# Patient Record
Sex: MALE | Race: WHITE
[De-identification: names, ages, dates, MRNs, and addresses within clinical notes are randomized per-mention and may not be internally consistent; named-entity substitution may affect disease eponyms.]

---

## 2018-02-22 ENCOUNTER — HOSPITAL ENCOUNTER (OUTPATIENT)
Dept: HOSPITAL 89 - LAB | Age: 83
End: 2018-02-22
Attending: INTERNAL MEDICINE
Payer: MEDICARE

## 2018-02-22 DIAGNOSIS — I10: ICD-10-CM

## 2018-02-22 DIAGNOSIS — N40.0: Primary | ICD-10-CM

## 2018-02-22 DIAGNOSIS — E11.9: ICD-10-CM

## 2018-02-22 DIAGNOSIS — I25.10: ICD-10-CM

## 2018-02-22 DIAGNOSIS — E78.5: ICD-10-CM

## 2018-02-22 DIAGNOSIS — Z86.718: ICD-10-CM

## 2018-02-22 LAB
INR PPP: 2.68
LDLC SERPL-MCNC: 69 MG/DL
PLATELET COUNT, AUTOMATED: 109 K/UL (ref 150–450)

## 2018-02-22 PROCEDURE — 84478 ASSAY OF TRIGLYCERIDES: CPT

## 2018-02-22 PROCEDURE — 83036 HEMOGLOBIN GLYCOSYLATED A1C: CPT

## 2018-02-22 PROCEDURE — 82310 ASSAY OF CALCIUM: CPT

## 2018-02-22 PROCEDURE — 82947 ASSAY GLUCOSE BLOOD QUANT: CPT

## 2018-02-22 PROCEDURE — 85610 PROTHROMBIN TIME: CPT

## 2018-02-22 PROCEDURE — 36415 COLL VENOUS BLD VENIPUNCTURE: CPT

## 2018-02-22 PROCEDURE — 84550 ASSAY OF BLOOD/URIC ACID: CPT

## 2018-02-22 PROCEDURE — 84155 ASSAY OF PROTEIN SERUM: CPT

## 2018-02-22 PROCEDURE — 85025 COMPLETE CBC W/AUTO DIFF WBC: CPT

## 2018-02-22 PROCEDURE — 84450 TRANSFERASE (AST) (SGOT): CPT

## 2018-02-22 PROCEDURE — 82247 BILIRUBIN TOTAL: CPT

## 2018-02-22 PROCEDURE — 84443 ASSAY THYROID STIM HORMONE: CPT

## 2018-02-22 PROCEDURE — 82374 ASSAY BLOOD CARBON DIOXIDE: CPT

## 2018-02-22 PROCEDURE — 82465 ASSAY BLD/SERUM CHOLESTEROL: CPT

## 2018-02-22 PROCEDURE — 82435 ASSAY OF BLOOD CHLORIDE: CPT

## 2018-02-22 PROCEDURE — 82565 ASSAY OF CREATININE: CPT

## 2018-02-22 PROCEDURE — 83718 ASSAY OF LIPOPROTEIN: CPT

## 2018-02-22 PROCEDURE — 84132 ASSAY OF SERUM POTASSIUM: CPT

## 2018-02-22 PROCEDURE — 84075 ASSAY ALKALINE PHOSPHATASE: CPT

## 2018-02-22 PROCEDURE — 82040 ASSAY OF SERUM ALBUMIN: CPT

## 2018-02-22 PROCEDURE — 84295 ASSAY OF SERUM SODIUM: CPT

## 2018-02-22 PROCEDURE — 84520 ASSAY OF UREA NITROGEN: CPT

## 2018-02-22 PROCEDURE — 81001 URINALYSIS AUTO W/SCOPE: CPT

## 2018-02-22 PROCEDURE — 84460 ALANINE AMINO (ALT) (SGPT): CPT

## 2018-02-23 ENCOUNTER — HOSPITAL ENCOUNTER (OUTPATIENT)
Dept: HOSPITAL 89 - AMB | Age: 83
End: 2018-02-23
Payer: MEDICARE

## 2018-02-23 ENCOUNTER — HOSPITAL ENCOUNTER (EMERGENCY)
Dept: HOSPITAL 89 - ER | Age: 83
Discharge: ANOTHER HOSPITAL | End: 2018-02-23
Payer: MEDICARE

## 2018-02-23 VITALS — SYSTOLIC BLOOD PRESSURE: 130 MMHG | DIASTOLIC BLOOD PRESSURE: 85 MMHG

## 2018-02-23 DIAGNOSIS — I21.4: Primary | ICD-10-CM

## 2018-02-23 DIAGNOSIS — R06.02: Primary | ICD-10-CM

## 2018-02-23 DIAGNOSIS — R09.02: ICD-10-CM

## 2018-02-23 DIAGNOSIS — R53.1: ICD-10-CM

## 2018-02-23 LAB
INR PPP: 2.63
PLATELET COUNT, AUTOMATED: 93 K/UL (ref 150–450)

## 2018-02-23 PROCEDURE — 87502 INFLUENZA DNA AMP PROBE: CPT

## 2018-02-23 PROCEDURE — 71045 X-RAY EXAM CHEST 1 VIEW: CPT

## 2018-02-23 PROCEDURE — 82247 BILIRUBIN TOTAL: CPT

## 2018-02-23 PROCEDURE — 84520 ASSAY OF UREA NITROGEN: CPT

## 2018-02-23 PROCEDURE — 85730 THROMBOPLASTIN TIME PARTIAL: CPT

## 2018-02-23 PROCEDURE — 82435 ASSAY OF BLOOD CHLORIDE: CPT

## 2018-02-23 PROCEDURE — 93005 ELECTROCARDIOGRAM TRACING: CPT

## 2018-02-23 PROCEDURE — 82374 ASSAY BLOOD CARBON DIOXIDE: CPT

## 2018-02-23 PROCEDURE — 82040 ASSAY OF SERUM ALBUMIN: CPT

## 2018-02-23 PROCEDURE — 85610 PROTHROMBIN TIME: CPT

## 2018-02-23 PROCEDURE — 99285 EMERGENCY DEPT VISIT HI MDM: CPT

## 2018-02-23 PROCEDURE — 82310 ASSAY OF CALCIUM: CPT

## 2018-02-23 PROCEDURE — 85379 FIBRIN DEGRADATION QUANT: CPT

## 2018-02-23 PROCEDURE — 82947 ASSAY GLUCOSE BLOOD QUANT: CPT

## 2018-02-23 PROCEDURE — 84450 TRANSFERASE (AST) (SGOT): CPT

## 2018-02-23 PROCEDURE — 84460 ALANINE AMINO (ALT) (SGPT): CPT

## 2018-02-23 PROCEDURE — 84295 ASSAY OF SERUM SODIUM: CPT

## 2018-02-23 PROCEDURE — 85025 COMPLETE CBC W/AUTO DIFF WBC: CPT

## 2018-02-23 PROCEDURE — 82565 ASSAY OF CREATININE: CPT

## 2018-02-23 PROCEDURE — 82375 ASSAY CARBOXYHB QUANT: CPT

## 2018-02-23 PROCEDURE — 84075 ASSAY ALKALINE PHOSPHATASE: CPT

## 2018-02-23 PROCEDURE — 84132 ASSAY OF SERUM POTASSIUM: CPT

## 2018-02-23 PROCEDURE — 84484 ASSAY OF TROPONIN QUANT: CPT

## 2018-02-23 PROCEDURE — 84155 ASSAY OF PROTEIN SERUM: CPT

## 2018-02-23 PROCEDURE — 83880 ASSAY OF NATRIURETIC PEPTIDE: CPT

## 2018-02-23 NOTE — ER REPORT
History and Physical


Time Seen By MD:  18:38


Hx. of Stated Complaint:  


CP, SOB SINCE YESTERDYA.


HPI/ROS


CHIEF COMPLAINT: Chest pain and shortness of breath





HISTORY OF PRESENT ILLNESS: This is an 88-year-old male. He has had chest pain 

and shortness of breath since yesterday. Chest pain is more substernal 

pressure. EMS arrived at his home after called by his granddaughter. His 

granddaughter called because he was having some confusion tonight as well. He 

usually wears 2 L of oxygen and was wearing this however oxygen saturations in 

the mid 70s. When switched to 5 L by nasal cannula by EMS, his oxygen improved 

up into the mid 90s and his confusion also improved. He is a poor historian and 

can give us General Information but not details. Sounds like he is on warfarin 

for history of DVT. He has a history of coronary artery bypass grafts as well 

as stents in the past. He does take a daily aspirin low dose. He denies having 

any fevers or chills. He always has a mild cough which is unchanged. EMS 

reported that they had a slight change on their carbon monoxide detector in 

their sending the fire department to look at the patient's home for possible 

carbon monoxide exposure.





REVIEW OF SYSTEMS:


Constitutional: No fever or chills.


Eyes: No vision changes.


ENT: No sore throat. No congestion.


Cardiovascular: As above.


Respiratory: As above.


Gastrointestinal: No abdominal pain. No nausea or vomiting.


Genitourinary: No pain with urination.


Musculoskeletal: No musculoskeletal pain.


Skin: No rashes.


Neurological: Feels overall weak. No headache. Mild dizziness.


Allergies:  


Coded Allergies:  


     adhesive tape (Verified  Allergy, Mild, RASH, 2/15/16)


     metformin (Verified  Allergy, Unknown, 11/8/17)


 EHR CONVERSION


Home Meds


Active Scripts


Simvastatin (SIMVASTATIN) 20 Mg Tablet, 20 MG PO HS, #90 TAB


   Prov:CHARELS GRACIA MD         2/13/18


Amiodarone Hcl (PACERONE) 200 Mg Tablet, 0.5 TAB PO QDAY, #90 TAB


   half tablet daily, x6 times per week.


   Prov:CHARLES GRACIA MD         1/19/18


Reported Medications


Bimatoprost (Bimatoprost) 0.03 % Drop.w.juan, 1 DROP OU QDAY


   12/14/17


Oxybutynin Chloride (OXYBUTYNIN CHLORIDE ER) 10 Mg Tab.er.24, 10 MG PO QDAY, 

TAB.SR


   12/14/17


Tamsulosin Hcl (TAMSULOSIN HCL) 0.4 Mg Cap.er.24h, 0.4 MG PO, CAP


   12/14/17


Solifenacin Succinate (VESICARE) 5 Mg Tablet, 5 MG PO


   11/8/17


Glimepiride (GLIMEPIRIDE) 1 Mg Tablet, 1 MG PO QDAY


   11/8/17


Calcium Polycarbophil (FIBERCON) 625 Mg Tablet, 625 MG PO


   11/8/17


Finasteride (FINASTERIDE) 5 Mg Tablet, 5 MG PO QDAY


   2/15/16


Warfarin Sodium (COUMADIN) 5 Mg Tablet, 5 MG PO TU,TH,SA,DUMONT


   2/15/16


Warfarin Sodium (COUMADIN) 2.5 Mg Tablet, 2.5 MG PO M,W,FR


   2/15/16


Oxygen (Oxygen) 2 L Inha, 2 L INH QHS, 0 Refills


   12/28/11


Lisinopril (Prinivil) 10 Mg Tab, 2.5 MG PO QDAY, #20 0 Refills


   12/28/11


Beta-Carotene(A) W-C & E/Min (Ocuvite Tablet) 1 Tab Tablet, 1 TAB PO DAILY, 0 

Refills


   12/28/11


Aspirin (Baby Aspirin) 81 Mg Tab.chew, 81 MG PO DAILY, 0 Refills


   ON HOLD NOW


   12/28/11


Past Medical/Surgical History


CABG, stents, DVT, chronic kidney disease, hypertension, bph


Reviewed Nurses Notes:  Yes


Hx Smoking:  Yes


Smoking Status:  Former Smoker


Exposure to Second Hand Smoke?:  No


Hx Substance Use Disorder:  No


Hx Alcohol Use:  Yes (every evening)


Constitutional





Vital Sign - Last 24 Hours








 2/23/18 2/23/18 2/23/18 2/23/18





 18:32 18:33 18:33 18:44


 


Temp  97.5  


 


Pulse  93  94


 


Resp  18  


 


B/P (MAP) 143/92 (109) 143/92  


 


Pulse Ox  94  90


 


O2 Delivery  Nasal Cannula  


 


O2 Flow Rate   4.0 


 


    





 2/23/18 2/23/18 2/23/18 2/23/18





 18:45 18:59 19:00 19:14


 


Pulse  92  91


 


B/P (MAP) 141/86 (104)  134/83 (100) 


 


Pulse Ox  89  90





 2/23/18 2/23/18 2/23/18 2/23/18





 19:15 19:29 19:30 19:44


 


Pulse  90  91


 


B/P (MAP) 132/81 (98)  130/75 (93) 


 


Pulse Ox  88  89





 2/23/18 2/23/18 2/23/18 2/23/18





 19:45 19:59 20:00 20:14


 


Pulse  94  94


 


B/P (MAP) 135/79 (97)  130/79 (96) 


 


Pulse Ox  92  91





 2/23/18 2/23/18 2/23/18 2/23/18





 20:15 20:20 20:30 20:35


 


Pulse  92  92


 


B/P (MAP) 140/88 (105)  140/84 (102) 


 


Pulse Ox  91  92





 2/23/18 2/23/18 2/23/18 2/23/18





 20:45 20:50 20:55 21:00


 


Pulse  88 86 


 


B/P (MAP) 136/78 (97)   130/85 (100)


 


Pulse Ox  89 90 








Physical Exam


   General Appearance: The patient is alert. No acute distress.


Eyes: Pupils are equal, round. Reactive to light. No pallor, injection or 

icterus. Extraocular movements are intact.


ENT: Mucous membranes are moist. Normal oral mucosa. Posterior oropharynx is 

normal.


Neck: Supple and non tender. No lymphadenopathy.


Respiratory: Breathing easily and unlabored. Lungs are clear to auscultation.


Cardiovascular: Regular rate and rhythm. No murmurs, gallops or rubs. Normal 

capillary refill. No edema.


Gastrointestinal: Abdomen is soft and non tender. Nondistended. Normal active 

bowel sounds.


Neurological: Alert and oriented to person and place, not to time. No focal 

neurologic deficits in the extremities. 


Skin: Warm and dry. No rashes.


Musculoskeletal: Extremities are nontender. Full range of motion.





DIFFERENTIAL DIAGNOSIS: After history and physical exam, differential diagnosis 

was considered for chest pain and shortness of breath including but not limited 

to myocardial ischemia, pericarditis pulmonary embolus, chest wall pain, 

pleural inflammation and pulmonary infectious causes.





Medical Decision Making


Data Points


Result Diagram:  


2/23/18 1830 2/23/18 1830





Laboratory





Hematology








Test


  2/23/18


00:00 2/23/18


18:30 2/23/18


18:49


 


Carboxyhemoglobin 2.3 % (< 5.0)   


 


Red Blood Count


  


  4.59 M/uL


(4.00-5.60) 


 


 


Mean Corpuscular Volume


  


  95.1 fL


(80.0-96.0) 


 


 


Mean Corpuscular Hemoglobin


  


  31.7 pg


(26.0-33.0) 


 


 


Mean Corpuscular Hemoglobin


Concent 


  33.3 g/dL


(32.0-36.0) 


 


 


Red Cell Distribution Width


  


  15.3 %


(11.5-14.5) 


 


 


Mean Platelet Volume


  


  8.7 fL


(7.2-11.1) 


 


 


Neutrophils (%) (Auto)


  


  59.1 %


(39.4-72.5) 


 


 


Lymphocytes (%) (Auto)


  


  32.0 %


(17.6-49.6) 


 


 


Monocytes (%) (Auto)


  


  8.8 %


(4.1-12.4) 


 


 


Eosinophils (%) (Auto)


  


  0.0 %


(0.4-6.7) 


 


 


Basophils (%) (Auto)


  


  0.1 %


(0.3-1.4) 


 


 


Nucleated RBC Relative Count


(auto) 


  0.0 /100WBC 


  


 


 


Neutrophils # (Auto)


  


  10.3 K/uL


(2.0-7.4) 


 


 


Lymphocytes # (Auto)


  


  5.6 K/uL


(1.3-3.6) 


 


 


Monocytes # (Auto)


  


  1.5 K/uL


(0.3-1.0) 


 


 


Eosinophils # (Auto)


  


  0.0 K/uL


(0.0-0.5) 


 


 


Basophils # (Auto)


  


  0.0 K/uL


(0.0-0.1) 


 


 


Nucleated RBC Absolute Count


(auto) 


  0.00 K/uL 


  


 


 


Prothrombin Time


  


  29.0 seconds


(12.0-14.4) 


 


 


Prothromb Time International


Ratio 


  2.63 


  


 


 


Activated Partial


Thromboplast Time 


  53 seconds


(23-35) 


 


 


D-Dimer Quantitative (PE/DVT)


  


  0.42 ug/ml


(0-0.50) 


 


 


Sodium Level


  


  136 mmol/L


(137-145) 


 


 


Potassium Level


  


  4.9 mmol/L


(3.5-5.0) 


 


 


Chloride Level


  


  101 mmol/L


() 


 


 


Carbon Dioxide Level


  


  26 mmol/L


(22-30) 


 


 


Blood Urea Nitrogen


  


  37 mg/dl


(9-21) 


 


 


Creatinine


  


  1.60 mg/dl


(0.66-1.25) 


 


 


Glomerular Filtration Rate


Calc 


  41.0 


  


 


 


Random Glucose


  


  148 mg/dl


() 


 


 


Calcium Level


  


  8.9 mg/dl


(8.4-10.2) 


 


 


Total Bilirubin


  


  0.9 mg/dl


(0.2-1.3) 


 


 


Aspartate Amino Transf


(AST/SGOT) 


  270 U/L (0-35) 


  


 


 


Alanine Aminotransferase


(ALT/SGPT) 


  86 U/L (0-56) 


  


 


 


Alkaline Phosphatase  45 U/L (0-126)  


 


Troponin I  88.000 ng/ml  


 


B-Type Natriuretic Peptide


  


  1540 pg/ml


(0-100) 


 


 


Total Protein


  


  7.0 gm/dl


(6.3-8.2) 


 


 


Albumin


  


  3.8 g/dl


(3.5-5.0) 


 


 


Influenza Virus Type A (PCR)


  


  


  Negative


(NEGATIVE)


 


Influenza Virus Type B (PCR)


  


  


  Negative


(NEGATIVE)








Chemistry








Test


  2/23/18


00:00 2/23/18


18:30 2/23/18


18:49


 


Carboxyhemoglobin 2.3 % (< 5.0)   


 


White Blood Count


  


  17.5 k/uL


(4.5-11.0) 


 


 


Red Blood Count


  


  4.59 M/uL


(4.00-5.60) 


 


 


Hemoglobin


  


  14.6 g/dL


(14.0-18.0) 


 


 


Hematocrit


  


  43.7 %


(42.0-52.0) 


 


 


Mean Corpuscular Volume


  


  95.1 fL


(80.0-96.0) 


 


 


Mean Corpuscular Hemoglobin


  


  31.7 pg


(26.0-33.0) 


 


 


Mean Corpuscular Hemoglobin


Concent 


  33.3 g/dL


(32.0-36.0) 


 


 


Red Cell Distribution Width


  


  15.3 %


(11.5-14.5) 


 


 


Platelet Count


  


  93 K/uL


(150-450) 


 


 


Mean Platelet Volume


  


  8.7 fL


(7.2-11.1) 


 


 


Neutrophils (%) (Auto)


  


  59.1 %


(39.4-72.5) 


 


 


Lymphocytes (%) (Auto)


  


  32.0 %


(17.6-49.6) 


 


 


Monocytes (%) (Auto)


  


  8.8 %


(4.1-12.4) 


 


 


Eosinophils (%) (Auto)


  


  0.0 %


(0.4-6.7) 


 


 


Basophils (%) (Auto)


  


  0.1 %


(0.3-1.4) 


 


 


Nucleated RBC Relative Count


(auto) 


  0.0 /100WBC 


  


 


 


Neutrophils # (Auto)


  


  10.3 K/uL


(2.0-7.4) 


 


 


Lymphocytes # (Auto)


  


  5.6 K/uL


(1.3-3.6) 


 


 


Monocytes # (Auto)


  


  1.5 K/uL


(0.3-1.0) 


 


 


Eosinophils # (Auto)


  


  0.0 K/uL


(0.0-0.5) 


 


 


Basophils # (Auto)


  


  0.0 K/uL


(0.0-0.1) 


 


 


Nucleated RBC Absolute Count


(auto) 


  0.00 K/uL 


  


 


 


Prothrombin Time


  


  29.0 seconds


(12.0-14.4) 


 


 


Prothromb Time International


Ratio 


  2.63 


  


 


 


Activated Partial


Thromboplast Time 


  53 seconds


(23-35) 


 


 


D-Dimer Quantitative (PE/DVT)


  


  0.42 ug/ml


(0-0.50) 


 


 


Glomerular Filtration Rate


Calc 


  41.0 


  


 


 


Calcium Level


  


  8.9 mg/dl


(8.4-10.2) 


 


 


Total Bilirubin


  


  0.9 mg/dl


(0.2-1.3) 


 


 


Aspartate Amino Transf


(AST/SGOT) 


  270 U/L (0-35) 


  


 


 


Alanine Aminotransferase


(ALT/SGPT) 


  86 U/L (0-56) 


  


 


 


Alkaline Phosphatase  45 U/L (0-126)  


 


Troponin I  88.000 ng/ml  


 


B-Type Natriuretic Peptide


  


  1540 pg/ml


(0-100) 


 


 


Total Protein


  


  7.0 gm/dl


(6.3-8.2) 


 


 


Albumin


  


  3.8 g/dl


(3.5-5.0) 


 


 


Influenza Virus Type A (PCR)


  


  


  Negative


(NEGATIVE)


 


Influenza Virus Type B (PCR)


  


  


  Negative


(NEGATIVE)








Coagulation








Test


  2/23/18


18:30


 


Prothrombin Time 29.0 seconds 


 


Prothromb Time International


Ratio 2.63 


 


 


Activated Partial


Thromboplast Time 53 seconds 


 


 


D-Dimer Quantitative (PE/DVT) 0.42 ug/ml 











EKG/Imaging


EKG Interpretation


12 lead EKG:


      Rhythm: Sinus rhythm, premature atrial complexes, rate 95


      Axis: Left axis


      QRS: Right bundle branch block. Q waves in the inferior leads


      ST segments: ST depression in leads V3 through V5. Biphasic T waves in 

the inferior leads.


Imaging


CHEST SINGLE AP


 


HISTORY: Chest pain. Shortness of breath.


 


COMPARISON: 7/20/2017.


 


FINDINGS: A single portable AP view of the chest is obtained.


 


Lines/tubes:  None.


Lungs/pleura:  Increased prominence of interstitial markings and pulmonary 

vasculature compared with 7/20/2017. Possible small pleural effusions. No 

pneumothorax.


Heart:  Stable heart size.


Mediastinum:  Stable mediastinal contours.


Bony structures/body wall:  No acute osseous abnormality.


 


IMPRESSION:


 


Increased prominence of pulmonary interstitial markings and vasculature 

compared with 7/20/2017 suspicious for pulmonary edema or atypical infection. 

Possible small pleural effusions.


 


Report Dictated By: Zohaib Brand MD at 2/23/2018 7:09 PM





ED Course/Re-evaluation


Clinical Indication for ER IV:  Hydration, IV Access


ED Course


After the initial evaluation, labs were obtained. Patient given aspirin 81mg x3 

oral dose. EKG as noted above. Labs show elevated white blood cell count with 

shift, but no signs of infiltrate on chest x-ray. Troponin came back with 

critical elevation of 88.000. I discussed this with the patient and his 

granddaughter. They would like us to call [facility] for further evaluation. He has 

had work done in the remote past at [place], but has not seen anyone for quite 

some time. Discussed with cardiology and the hospitalist and will transport to 

[facility]. Accepting physician is Dr. Waggoner, hospitalist. Gave Plavix 300mg oral 

dose. He is therapeutic INR so no heparin at this time. Still with some 

substernal chest pressure. Shortness of breath has improved with his oxygen 

treatment.


Decision to Disposition Date:  Feb 23, 2018


Decision to Disposition Time:  20:05





Depart


Departure


Latest Vital Signs





Vital Signs








  Date Time  Temp Pulse Resp B/P (MAP) Pulse Ox O2 Delivery O2 Flow Rate FiO2


 


2/23/18 21:00    130/85 (100)    


 


2/23/18 20:55  86   90   


 


2/23/18 18:33       4.0 


 


2/23/18 18:33 97.5  18   Nasal Cannula  








Impression:  


 Primary Impression:  


 NSTEMI (non-ST elevated myocardial infarction)


Condition:  Improved


Disposition:  HOME OR SELF-CARE


Referrals:  


CHARLES GRACIA MD (PCP)











BENNY KHANNA MD Feb 23, 2018 18:44

## 2018-02-23 NOTE — EKG
[place] [facility] [place] 

 

PATIENT NAME: CORNELIUS GUSTAFSON

: [id number]

MR: [id number]

V: [id number]

EXAM DATE: 

ORDERING PHYSICIAN: BENNY KHANNA

TECHNOLOGIST: CISNEROS

 

Test Reason : CHEST PAIN

Blood Pressure : ***/*** mmHG

Vent. Rate : 095 BPM     Atrial Rate : 095 BPM

   P-R Int : 204 ms          QRS Dur : 140 ms

    QT Int : 390 ms       P-R-T Axes : 081 109 033 degrees

   QTc Int : 490 ms

 

Sinus rhythm with premature atrial complexes

Right bundle branch block

Inferior infarct , age undetermined

Abnormal ECG

No previous ECGs available

Confirmed by ANA AUGUSTE ([id number]) on 2018 11:24:47 PM

 

Referred By:             Confirmed By:ANA AUGUSTE

## 2018-02-23 NOTE — RADIOLOGY IMAGING REPORT
FACILITY: [facility] 

 

PATIENT NAME: Pavel Tenorio

: 1929

MR: [id number]

V: [id number]

EXAM DATE: 

ORDERING PHYSICIAN: BENNY KHANNA

TECHNOLOGIST: 

 

Location: [facility]

Patient: Pavel Tenorio

: 1929

MRN: [id number]

Visit/Account:[id number]

Date of Sevice:  2018

 

ACCESSION #: [id number]

 

CHEST SINGLE AP

 

HISTORY: Chest pain. Shortness of breath.

 

COMPARISON: 2017.

 

FINDINGS: A single portable AP view of the chest is obtained.

 

Lines/tubes:  None.

Lungs/pleura:  Increased prominence of interstitial markings and pulmonary vasculature compared with 
2017. Possible small pleural effusions. No pneumothorax.

Heart:  Stable heart size.

Mediastinum:  Stable mediastinal contours.

Bony structures/body wall:  No acute osseous abnormality.

 

IMPRESSION:

 

Increased prominence of pulmonary interstitial markings and vasculature compared with 2017 suspi
cious for pulmonary edema or atypical infection. Possible small pleural effusions.

 

Report Dictated By: Zohaib Brand MD at 2018 7:09 PM

 

Report E-Signed By: Zohaib Brand MD  at 2018 7:12 PM

 

WSN:[id number]